# Patient Record
Sex: FEMALE | Race: WHITE | NOT HISPANIC OR LATINO
[De-identification: names, ages, dates, MRNs, and addresses within clinical notes are randomized per-mention and may not be internally consistent; named-entity substitution may affect disease eponyms.]

---

## 2021-10-11 PROBLEM — Z00.00 ENCOUNTER FOR PREVENTIVE HEALTH EXAMINATION: Status: ACTIVE | Noted: 2021-10-11

## 2021-11-23 ENCOUNTER — APPOINTMENT (OUTPATIENT)
Dept: CARDIOLOGY | Facility: CLINIC | Age: 66
End: 2021-11-23
Payer: COMMERCIAL

## 2021-11-23 ENCOUNTER — NON-APPOINTMENT (OUTPATIENT)
Age: 66
End: 2021-11-23

## 2021-11-23 VITALS
TEMPERATURE: 97.9 F | BODY MASS INDEX: 23.04 KG/M2 | HEART RATE: 65 BPM | WEIGHT: 130 LBS | RESPIRATION RATE: 98 BRPM | HEIGHT: 63 IN | SYSTOLIC BLOOD PRESSURE: 120 MMHG | DIASTOLIC BLOOD PRESSURE: 75 MMHG

## 2021-11-23 DIAGNOSIS — Z82.49 FAMILY HISTORY OF ISCHEMIC HEART DISEASE AND OTHER DISEASES OF THE CIRCULATORY SYSTEM: ICD-10-CM

## 2021-11-23 DIAGNOSIS — M81.0 AGE-RELATED OSTEOPOROSIS W/OUT CURRENT PATHOLOGICAL FRACTURE: ICD-10-CM

## 2021-11-23 DIAGNOSIS — F41.9 ANXIETY DISORDER, UNSPECIFIED: ICD-10-CM

## 2021-11-23 DIAGNOSIS — R07.89 OTHER CHEST PAIN: ICD-10-CM

## 2021-11-23 DIAGNOSIS — L40.9 PSORIASIS, UNSPECIFIED: ICD-10-CM

## 2021-11-23 DIAGNOSIS — E06.3 AUTOIMMUNE THYROIDITIS: ICD-10-CM

## 2021-11-23 PROCEDURE — 99203 OFFICE O/P NEW LOW 30 MIN: CPT

## 2021-11-23 PROCEDURE — 93000 ELECTROCARDIOGRAM COMPLETE: CPT

## 2021-11-23 RX ORDER — ESCITALOPRAM OXALATE 10 MG/1
10 TABLET ORAL
Qty: 30 | Refills: 3 | Status: ACTIVE | COMMUNITY
Start: 2021-11-23

## 2021-11-23 RX ORDER — LEVOTHYROXINE SODIUM 0.09 MG/1
88 TABLET ORAL DAILY
Qty: 30 | Refills: 3 | Status: ACTIVE | COMMUNITY
Start: 2021-11-23

## 2021-11-23 RX ORDER — TRAZODONE HYDROCHLORIDE 150 MG/1
150 TABLET ORAL
Qty: 30 | Refills: 0 | Status: ACTIVE | COMMUNITY
Start: 2021-11-23

## 2021-11-23 NOTE — DISCUSSION/SUMMARY
[FreeTextEntry1] : The patient's clinical symptoms are not suggestive of a coronary origin of her discomfort. I believe the symptoms were muscular in nature. Patient's clinical examination is normal her blood pressure is normal her electrocardiogram is normal.\par I did not feel that further cardiac testing was warranted at this time. However I did recommend that the patient call if symptoms persist.\par I recommended a program of exercise although her back has been bothering her lately.\par I discussed diet in great detail.  I'm recommending a natural diet free of artificial fluids et cetera.\par I asked that the patient forward me her upcoming blood test will be done by her primary care physician.\par \par CC; DR JACOBS

## 2021-11-23 NOTE — REASON FOR VISIT
[FreeTextEntry1] : The patient comes for evaluation. The complaint is an atypical discomfort in the left upper pectoral region in the region of the lateral aspect of the pectoralis major muscle. The symptom was unrelated to physical exertion and has completely resolved. The patient had no accompanying tautness of breath or palpitations.\par There is no history of cardiovascular disease. The patient has no history of hypertension diabetes or hyperlipidemia.\par He recently experienced a severe personal loss and her family.\par The patient has a family history of coronary artery disease (her father had coronary bypass).

## 2022-12-19 ENCOUNTER — OFFICE (OUTPATIENT)
Dept: URBAN - METROPOLITAN AREA CLINIC 29 | Facility: CLINIC | Age: 67
Setting detail: OPHTHALMOLOGY
End: 2022-12-19
Payer: COMMERCIAL

## 2022-12-19 DIAGNOSIS — Z96.1: ICD-10-CM

## 2022-12-19 DIAGNOSIS — H52.4: ICD-10-CM

## 2022-12-19 DIAGNOSIS — H02.89: ICD-10-CM

## 2022-12-19 DIAGNOSIS — H10.13: ICD-10-CM

## 2022-12-19 DIAGNOSIS — H43.813: ICD-10-CM

## 2022-12-19 PROCEDURE — 92014 COMPRE OPH EXAM EST PT 1/>: CPT | Performed by: OPHTHALMOLOGY

## 2022-12-19 PROCEDURE — 92015 DETERMINE REFRACTIVE STATE: CPT | Performed by: OPHTHALMOLOGY

## 2022-12-19 ASSESSMENT — REFRACTION_AUTOREFRACTION
OD_AXIS: 016
OS_SPHERE: -0.50
OD_CYLINDER: +1.00
OS_CYLINDER: +0.25
OS_AXIS: 170
OD_SPHERE: -0.75

## 2022-12-19 ASSESSMENT — LID EXAM ASSESSMENTS
OS_MEIBOMITIS: LLL 1+
OD_MEIBOMITIS: RLL 1+

## 2022-12-19 ASSESSMENT — TONOMETRY
OS_IOP_MMHG: 12
OD_IOP_MMHG: 12

## 2022-12-19 ASSESSMENT — SPHEQUIV_DERIVED
OS_SPHEQUIV: -0.375
OD_SPHEQUIV: -0.25
OD_SPHEQUIV: -0.25

## 2022-12-19 ASSESSMENT — VISUAL ACUITY
OD_BCVA: 20/20
OS_BCVA: 20/20

## 2022-12-19 ASSESSMENT — REFRACTION_MANIFEST
OS_SPHERE: -0.50
OS_CYLINDER: SPHERE
OD_AXIS: 10
OD_CYLINDER: +0.50
OD_ADD: +1.75
OD_SPHERE: -0.50
OS_ADD: +1.75

## 2022-12-19 ASSESSMENT — REFRACTION_CURRENTRX
OD_OVR_VA: 20/
OS_ADD: +1.25
OD_AXIS: 009
OS_SPHERE: -0.50
OS_OVR_VA: 20/
OD_ADD: +1.25
OD_SPHERE: -0.50
OS_CYLINDER: SPH
OD_CYLINDER: +0.50

## 2022-12-19 ASSESSMENT — CONFRONTATIONAL VISUAL FIELD TEST (CVF)
OD_FINDINGS: FULL
OS_FINDINGS: FULL

## 2023-01-25 ENCOUNTER — OFFICE (OUTPATIENT)
Dept: URBAN - METROPOLITAN AREA CLINIC 29 | Facility: CLINIC | Age: 68
Setting detail: OPHTHALMOLOGY
End: 2023-01-25
Payer: COMMERCIAL

## 2023-01-25 DIAGNOSIS — H01.004: ICD-10-CM

## 2023-01-25 DIAGNOSIS — H10.33: ICD-10-CM

## 2023-01-25 DIAGNOSIS — H01.001: ICD-10-CM

## 2023-01-25 PROCEDURE — 92012 INTRM OPH EXAM EST PATIENT: CPT | Performed by: OPHTHALMOLOGY

## 2023-01-25 ASSESSMENT — REFRACTION_CURRENTRX
OD_CYLINDER: +0.50
OD_SPHERE: -0.50
OS_CYLINDER: SPH
OS_SPHERE: -0.50
OS_ADD: +1.25
OD_AXIS: 009
OS_OVR_VA: 20/
OD_ADD: +1.25
OD_OVR_VA: 20/

## 2023-01-25 ASSESSMENT — CONFRONTATIONAL VISUAL FIELD TEST (CVF)
OD_FINDINGS: FULL
OS_FINDINGS: FULL

## 2023-01-25 ASSESSMENT — LID EXAM ASSESSMENTS
OD_MEIBOMITIS: RLL 1+
OS_MEIBOMITIS: LLL 1+

## 2023-01-25 ASSESSMENT — REFRACTION_AUTOREFRACTION
OS_SPHERE: -0.75
OD_AXIS: 35
OD_CYLINDER: +0.50
OS_CYLINDER: +0.25
OD_SPHERE: -1.00
OS_AXIS: 163

## 2023-01-25 ASSESSMENT — TONOMETRY
OD_IOP_MMHG: 12
OS_IOP_MMHG: 12

## 2023-01-25 ASSESSMENT — REFRACTION_MANIFEST
OD_CYLINDER: +0.50
OS_ADD: +1.75
OD_AXIS: 10
OD_SPHERE: -0.50
OS_SPHERE: -0.50
OS_CYLINDER: SPHERE
OD_ADD: +1.75

## 2023-01-25 ASSESSMENT — VISUAL ACUITY
OD_BCVA: 20/20-1
OS_BCVA: 20/20

## 2023-01-25 ASSESSMENT — SPHEQUIV_DERIVED
OS_SPHEQUIV: -0.625
OD_SPHEQUIV: -0.75
OD_SPHEQUIV: -0.25

## 2023-10-19 ENCOUNTER — OFFICE (OUTPATIENT)
Dept: URBAN - METROPOLITAN AREA CLINIC 29 | Facility: CLINIC | Age: 68
Setting detail: OPHTHALMOLOGY
End: 2023-10-19
Payer: COMMERCIAL

## 2023-10-19 DIAGNOSIS — H10.13: ICD-10-CM

## 2023-10-19 DIAGNOSIS — H43.813: ICD-10-CM

## 2023-10-19 PROCEDURE — 99213 OFFICE O/P EST LOW 20 MIN: CPT | Performed by: OPHTHALMOLOGY

## 2023-10-19 ASSESSMENT — REFRACTION_MANIFEST
OD_AXIS: 10
OD_ADD: +1.75
OS_CYLINDER: SPHERE
OD_CYLINDER: +0.50
OS_SPHERE: -0.50
OS_ADD: +1.75
OD_SPHERE: -0.50

## 2023-10-19 ASSESSMENT — REFRACTION_AUTOREFRACTION
OS_CYLINDER: +0.25
OS_AXIS: 163
OD_CYLINDER: +0.50
OD_SPHERE: -1.00
OD_AXIS: 35
OS_SPHERE: -0.75

## 2023-10-19 ASSESSMENT — LID EXAM ASSESSMENTS
OS_MEIBOMITIS: LLL 1+
OD_MEIBOMITIS: RLL 1+

## 2023-10-19 ASSESSMENT — REFRACTION_CURRENTRX
OD_ADD: +1.25
OS_CYLINDER: SPH
OS_SPHERE: -0.50
OD_CYLINDER: +0.50
OD_SPHERE: -0.50
OS_OVR_VA: 20/
OD_AXIS: 009
OS_ADD: +1.25
OD_OVR_VA: 20/

## 2023-10-19 ASSESSMENT — VISUAL ACUITY
OS_BCVA: 20/25-2
OD_BCVA: 20/20-1

## 2023-10-19 ASSESSMENT — SPHEQUIV_DERIVED
OD_SPHEQUIV: -0.25
OS_SPHEQUIV: -0.625
OD_SPHEQUIV: -0.75

## 2023-10-19 ASSESSMENT — CONFRONTATIONAL VISUAL FIELD TEST (CVF)
OD_FINDINGS: FULL
OS_FINDINGS: FULL

## 2023-10-30 ENCOUNTER — OFFICE (OUTPATIENT)
Dept: URBAN - METROPOLITAN AREA CLINIC 29 | Facility: CLINIC | Age: 68
Setting detail: OPHTHALMOLOGY
End: 2023-10-30
Payer: COMMERCIAL

## 2023-10-30 DIAGNOSIS — H43.813: ICD-10-CM

## 2023-10-30 DIAGNOSIS — H10.13: ICD-10-CM

## 2023-10-30 PROCEDURE — 99213 OFFICE O/P EST LOW 20 MIN: CPT | Performed by: OPHTHALMOLOGY

## 2023-10-30 ASSESSMENT — REFRACTION_AUTOREFRACTION
OD_SPHERE: -1.00
OD_AXIS: 35
OS_AXIS: 163
OD_CYLINDER: +0.50
OS_SPHERE: -0.75
OS_CYLINDER: +0.25

## 2023-10-30 ASSESSMENT — VISUAL ACUITY
OS_BCVA: 20/25-1
OD_BCVA: 20/20

## 2023-10-30 ASSESSMENT — REFRACTION_CURRENTRX
OS_CYLINDER: SPH
OD_AXIS: 009
OD_SPHERE: -0.50
OS_SPHERE: -0.50
OD_CYLINDER: +0.50
OS_ADD: +1.25
OD_OVR_VA: 20/
OS_OVR_VA: 20/
OD_ADD: +1.25

## 2023-10-30 ASSESSMENT — REFRACTION_MANIFEST
OD_ADD: +1.75
OD_AXIS: 10
OS_ADD: +1.75
OS_CYLINDER: SPHERE
OD_SPHERE: -0.50
OD_CYLINDER: +0.50
OS_SPHERE: -0.50

## 2023-10-30 ASSESSMENT — CONFRONTATIONAL VISUAL FIELD TEST (CVF)
OS_FINDINGS: FULL
OD_FINDINGS: FULL

## 2023-10-30 ASSESSMENT — SPHEQUIV_DERIVED
OD_SPHEQUIV: -0.25
OS_SPHEQUIV: -0.625
OD_SPHEQUIV: -0.75

## 2023-10-30 ASSESSMENT — LID EXAM ASSESSMENTS
OS_MEIBOMITIS: LLL 1+
OD_MEIBOMITIS: RLL 1+

## 2025-01-29 ENCOUNTER — TRANSCRIPTION ENCOUNTER (OUTPATIENT)
Age: 70
End: 2025-01-29

## 2025-05-01 ENCOUNTER — APPOINTMENT (OUTPATIENT)
Dept: ORTHOPEDIC SURGERY | Facility: CLINIC | Age: 70
End: 2025-05-01

## 2025-05-06 ENCOUNTER — TRANSCRIPTION ENCOUNTER (OUTPATIENT)
Age: 70
End: 2025-05-06

## 2025-05-13 DIAGNOSIS — M25.512 PAIN IN LEFT SHOULDER: ICD-10-CM

## 2025-05-14 ENCOUNTER — APPOINTMENT (OUTPATIENT)
Dept: ORTHOPEDIC SURGERY | Facility: CLINIC | Age: 70
End: 2025-05-14
Payer: COMMERCIAL

## 2025-05-14 VITALS
SYSTOLIC BLOOD PRESSURE: 153 MMHG | OXYGEN SATURATION: 99 % | WEIGHT: 143 LBS | HEIGHT: 63 IN | BODY MASS INDEX: 25.34 KG/M2 | HEART RATE: 71 BPM | TEMPERATURE: 98.3 F | DIASTOLIC BLOOD PRESSURE: 76 MMHG | RESPIRATION RATE: 16 BRPM

## 2025-05-14 DIAGNOSIS — S43.422A SPRAIN OF LEFT ROTATOR CUFF CAPSULE, INITIAL ENCOUNTER: ICD-10-CM

## 2025-05-14 DIAGNOSIS — M25.812 OTHER SPECIFIED JOINT DISORDERS, LEFT SHOULDER: ICD-10-CM

## 2025-05-14 DIAGNOSIS — M75.22 BICIPITAL TENDINITIS, LEFT SHOULDER: ICD-10-CM

## 2025-05-14 PROCEDURE — 73030 X-RAY EXAM OF SHOULDER: CPT | Mod: LT

## 2025-05-14 PROCEDURE — 99204 OFFICE O/P NEW MOD 45 MIN: CPT | Mod: 25

## 2025-05-14 PROCEDURE — 20610 DRAIN/INJ JOINT/BURSA W/O US: CPT | Mod: LT
